# Patient Record
Sex: MALE | Race: BLACK OR AFRICAN AMERICAN
[De-identification: names, ages, dates, MRNs, and addresses within clinical notes are randomized per-mention and may not be internally consistent; named-entity substitution may affect disease eponyms.]

---

## 2021-11-29 ENCOUNTER — HOSPITAL ENCOUNTER (OUTPATIENT)
Dept: HOSPITAL 75 - RAD | Age: 21
End: 2021-11-29
Attending: STUDENT IN AN ORGANIZED HEALTH CARE EDUCATION/TRAINING PROGRAM
Payer: COMMERCIAL

## 2021-11-29 DIAGNOSIS — S62.022S: ICD-10-CM

## 2021-11-29 DIAGNOSIS — M25.532: Primary | ICD-10-CM
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
## 2021-11-29 PROCEDURE — 73200 CT UPPER EXTREMITY W/O DYE: CPT

## 2023-08-28 NOTE — DIAGNOSTIC IMAGING REPORT
Care Due:                  Date            Visit Type   Department     Provider  --------------------------------------------------------------------------------                                EP -                              PRIMARY      [facility] INTERNAL  Last Visit: 03-      CARE ([facility])   RAHUL Ramírez                              EP -                              PRIMARY      [facility] INTERNAL  Next Visit: 09-      CARE ([facility])   RAHUL Ramírez                                                            Last  Test          Frequency    Reason                     Performed    Due Date  --------------------------------------------------------------------------------    HBA1C.......  6 months...  TRULICITY, empagliflozin,   01- 07-                             metFORMIN................    Lipid Panel.  12 months..  simvastatin..............  Not Found    Overdue    [employer] Embedded Care Due Messages. Reference number: [id number].   8/28/2023 12:22:32 AM CDT   PROCEDURE: CT left upper extremity without contrast.



TECHNIQUE: Multiple contiguous axial images were obtained through

the left upper extremity without the use of intravenous contrast.

Auto Exposure Controls were utilized during the CT exam to meet

ALARA standards for radiation dose reduction. 



INDICATION:  Chronic pain in the left wrist.



COMPARISON: None



FINDINGS:



There is a fracture of the proximal pole of the scaphoid, with

corticated fragments and sclerosis and subcortical cystlike

changes. There is a bone island in the distal capitate. No acute

fractures are seen. Alignment otherwise appears normal. No focal

osseous lesions are seen. Tendons and ligaments about the wrist

demonstrate no acute abnormality.



IMPRESSION:

1. Chronic fracture of the proximal pole of the left scaphoid.

There is some sclerosis, fragmentation and cystlike change which

may be due to osteonecrosis.



Dictated by: 



  Dictated on workstation # [id number]